# Patient Record
Sex: FEMALE | Race: WHITE | NOT HISPANIC OR LATINO | Employment: FULL TIME | URBAN - METROPOLITAN AREA
[De-identification: names, ages, dates, MRNs, and addresses within clinical notes are randomized per-mention and may not be internally consistent; named-entity substitution may affect disease eponyms.]

---

## 2017-01-24 ENCOUNTER — ALLSCRIPTS OFFICE VISIT (OUTPATIENT)
Dept: OTHER | Facility: OTHER | Age: 47
End: 2017-01-24

## 2017-02-01 ENCOUNTER — ALLSCRIPTS OFFICE VISIT (OUTPATIENT)
Dept: OTHER | Facility: OTHER | Age: 47
End: 2017-02-01

## 2017-02-06 ENCOUNTER — GENERIC CONVERSION - ENCOUNTER (OUTPATIENT)
Dept: OTHER | Facility: OTHER | Age: 47
End: 2017-02-06

## 2017-02-13 ENCOUNTER — GENERIC CONVERSION - ENCOUNTER (OUTPATIENT)
Dept: OTHER | Facility: OTHER | Age: 47
End: 2017-02-13

## 2017-08-04 ENCOUNTER — ALLSCRIPTS OFFICE VISIT (OUTPATIENT)
Dept: OTHER | Facility: OTHER | Age: 47
End: 2017-08-04

## 2017-08-04 ENCOUNTER — TRANSCRIBE ORDERS (OUTPATIENT)
Dept: ADMINISTRATIVE | Facility: HOSPITAL | Age: 47
End: 2017-08-04

## 2017-08-04 DIAGNOSIS — R40.0 SLEEPINESS: Primary | ICD-10-CM

## 2017-08-04 DIAGNOSIS — G47.00 PERSISTENT DISORDER OF INITIATING OR MAINTAINING SLEEP: ICD-10-CM

## 2017-08-04 DIAGNOSIS — F41.9 ANXIETY DISORDER: ICD-10-CM

## 2017-08-08 ENCOUNTER — TRANSCRIBE ORDERS (OUTPATIENT)
Dept: ADMINISTRATIVE | Facility: HOSPITAL | Age: 47
End: 2017-08-08

## 2017-08-08 DIAGNOSIS — G47.00 PERSISTENT DISORDER OF INITIATING OR MAINTAINING SLEEP: ICD-10-CM

## 2017-08-08 DIAGNOSIS — E66.9 OBESITY, UNSPECIFIED: ICD-10-CM

## 2017-08-08 DIAGNOSIS — R53.83 FATIGUE, UNSPECIFIED TYPE: ICD-10-CM

## 2017-08-08 DIAGNOSIS — R40.0 SLEEPINESS: Primary | ICD-10-CM

## 2017-08-10 ENCOUNTER — TRANSCRIBE ORDERS (OUTPATIENT)
Dept: SLEEP CENTER | Facility: CLINIC | Age: 47
End: 2017-08-10

## 2017-08-10 DIAGNOSIS — R40.0 DAYTIME SLEEPINESS: Primary | ICD-10-CM

## 2017-08-18 ENCOUNTER — TRANSCRIBE ORDERS (OUTPATIENT)
Dept: SLEEP CENTER | Facility: CLINIC | Age: 47
End: 2017-08-18

## 2018-01-09 NOTE — PROGRESS NOTES
Assessment    1  Encounter for preventive health examination (V70 0) (Z00 00)   2  Need for hepatitis B screening test (V73 89) (Z11 59)   3  Anxiety (300 00) (F41 9)   4  Iron deficiency anemia (280 9) (D50 9)   5  Obesity (278 00) (E66 9)   6  Tinea cruris (110 3) (B35 6)   7  Need for Tdap vaccination (V06 1) (Z23)    Plan  Anxiety, Iron deficiency anemia, Obesity    · (1) COMPREHENSIVE METABOLIC PANEL; Status:Active; Requested for:10Oct2016;    · (1) FERRITIN; Status:Active; Requested for:10Oct2016;    · (1) IRON; Status:Active; Requested for:10Oct2016;    · (1) TIBC; Status:Active; Requested for:10Oct2016;    · (1) TRANSFERRIN; Status:Active; Requested for:10Oct2016;    · (LC) CBC+Platelet+Hem Review; Status:Active; Requested for:10Oct2016;    · ([facility]) Lipid Panel; Status:Active; Requested for:10Oct2016;    · (LC) TSH reflex to T4F; Status:Active; Requested for:10Oct2016;   Need for hepatitis B screening test    · (LC) Hepatitis B Core Ab W/Reflex; Status:Active; Requested for:10Oct2016;    · ([facility]) Hepatitis B Surf Ab Quant; Status:Active; Requested for:10Oct2016;   Need for Tdap vaccination    · Tdap (Adacel)  PMH: Candidal intertrigo    · Clotrimazole-Betamethasone 1-0 05 % External Cream (Lotrisone); APPLY   AND RUB  IN A THIN FILM TO AFFECTED AREAS TWICE  DAILY  (AM AND PM)  DO NOT USE FOR OVER 2 WEEKS    Discussion/Summary  health maintenance visit Currently, she eats a poor diet, has an inadequate exercise regimen and Encouraged the patient to work on portion sizes as well as maintaining regular exercise  She reports that she will be going in the next few weeks for her next Pap smear  Breast cancer screening: She reports that she'll be getting this done through her gynecologist in the next few weeks  Colorectal cancer screening: colorectal cancer screening is not indicated  Osteoporosis screening: bone mineral density testing is not indicated  Screening lab work includes Labs ordered as above   The immunizations will be given as outlined in the orders and The patient declined a flu shot but took a tetanus vaccine today as above  Advice and education were given regarding nutrition, aerobic exercise and weight loss  Patient discussion: discussed with the patient  Anxiety - stable - continue with Ativan as needed for anxiety  Obesity - patient will check with her insurance to see if 6 end is covered  She states that she does not have any personal history of pancreatitis her family history of thyroid cancer  We discussed the medication and its use  If she is able to start her on it, I would want to follow-up one month after her starting it  New    Iron deficiency anemia (history of) - reassess with labs as above  We discussed that if she does have iron deficiency currently that she continue her to oral supplementation and I could provide her stool softeners to take or she could see a hematologist about the possibility of IV infusion iron  Tinea cruris - try Lotrisone as above  She was instructed not to use it for longer than 2 weeks at a time  Chief Complaint  Physical      History of Present Illness  HM, Adult Female: The patient is being seen for a health maintenance evaluation  General Health: The patient's health since the last visit is described as good   besides migraines and panic attacks  She has regular dental visits  She complains of vision problems  Vision care includes wearing glasses, an eye examination within the last year and got bifocals now  She denies hearing loss  Immunizations status: not up to date The patient needs the following immunization(s): tetanus vaccine  Lifestyle:  She consumes a diverse and healthy diet  (eats well-balanced meals - started protein shakes - drinks water )   She exercises regularly  She exercises 3 times per week  Exercise includes walking  She does not use tobacco  She denies alcohol use  She denies drug use     Reproductive health:  she reports abnormal menses  Menstrual history: The cycles are irregular  Screening: Cervical cancer screening includes a pap smear performed 2-3 years  Breast cancer screening includes a mammogram performed 2-3 years  Colorectal cancer screening includes no previous colonoscopy  Metabolic screening includes no previous lipid profile and glucose screening performed 2014  Safety elements used: seat belt, sunscreen, smoke detector and carbon monoxide detector  Risk findings: no domestic violence  HPI: rash recurring in the groin - thinks that some of the powder that she is in the past was helpful - not currently using anything right now- no longer swimming/lifeguarding so this cannot be the cause this time    wants to restart the Adipex - was having trouble with anxiety and migraines - notes that she walks and wants to avoid high impact because of knee issues      Review of Systems    Constitutional: no fever and no chills  Cardiovascular: no chest pain and no palpitations  Respiratory: besides SOB with panic, but no shortness of breath, no cough and no wheezing  Gastrointestinal: constipation and diarrhea, but no nausea and no vomiting  Genitourinary: no dysuria and no vaginal discharge  Musculoskeletal: no arthralgias and no myalgias  Integumentary: a rash  Neurological: no dizziness and no fainting    The patient presents with complaints of headache (migraines around cycle and occasionally on surprise (from stress?))  Psychiatric: anxiety, but as noted in HPI and no depression  Endocrine: no hot flashes  Hematologic/Lymphatic: no tendency for easy bleeding and no tendency for easy bruising  Active Problems    1  Adverse reaction to vaccine, initial encounter (E949 9) (T50 Z95A)   2  Anxiety (300 00) (F41 9)   3  Asthma (493 90) (J45 909)   4  History of Dysphagia (787 20) (R13 10)   5  Fatigue (780 79) (R53 83)   6  Iron deficiency anemia (280 9) (D50 9)   7   Irritable bowel syndrome (564  1) (K58 9)   8  Left knee pain (719 46) (M25 562)   9  Lung nodule < 6cm on CT (793 11) (R91 1)   10  Migraine headache (346 90) (G43 909)   11  Obesity (278 00) (E66 9)   12  Palpitations (785 1) (R00 2)   13  Positive PPD (795 51) (R76 11)   14  Tinea corporis (110 5) (B35 4)   15   Tinea cruris (110 3) (B35 6)    Past Medical History    · History of  (637 90)   · Acute bacterial conjunctivitis (372 03) (H10 30)   · History of BRBPR (bright red blood per rectum) (569 3) (K62 5)   · History of Candidal intertrigo (112 3) (B37 2)   · History of Cough (786 2) (R05)   · History of Coughing up blood (786 30) (R04 2)   · History of Dysphagia (787 20) (R13 10)   · History of acute bronchitis (V12 69) (Z87 09)   · History of bronchitis (V12 69) (Z87 09)   · History of chest pain (V13 89) (R27 911)   · History of contact dermatitis (V13 3) (Z87 2)   · History of folliculitis (M78 8) (I73 7)   · History of insomnia (V13 89) (Z87 898)   · History of low back pain (V13 59) (Z87 39)   · History of migraine (V12 49) (Z86 69)   · History of upper respiratory infection (V12 09) (Z87 09)   · History of viral gastroenteritis (V12 09) (Z86 19)   · History of viral infection (V12 09) (Z86 19)   · Mammogram abnormal (793 80) (R92 8)   · History of Mass of leg (782 2) (R22 40)   · History of Missed  (632) (O02 1)   · History of Scoliosis (737 30) (M41 9)   · History of Summary Of Previous Pregnancies  2  (Total No )   · History of Summary Of Previous Pregnancies Para 1  (Deliveries)   · History of Tendinitis of right rotator cuff (726 10) (M75 81)   · History of Tick bite (919 4,E906 4) (W57 XXXA)    Surgical History    · History of Bronchoscopy (Diagnostic)   · History of  Section   · History of Cholecystectomy   · History of Epidural Anesthesia   · History of Hernia Repair   · History of Laparoscopic Lysis Of Intestinal Adhesions   · History of Nasal Endoscopy (Diagnostic)   · History of Tonsillectomy With Adenoidectomy   · History of Upper Gastrointestinal Endoscopy (Therapeutic)    Family History  Mother    · No pertinent family history  Family History    · Family history of Basal cell cancer   · Family history of COPD (chronic obstructive pulmonary disease)   · Family history of Heart disease   · Family history of Hypertension (V17 49)    Social History    · Being A Social Drinker   · Denied: Daily Coffee Consumption (___ Cups/Day)   · Denied: History of Drug Use   · Marital History - Currently    · Never A Smoker    Current Meds   1  LORazepam 1 MG Oral Tablet; 1/2 to 1 tablet po up to tid prn anxiety; Therapy: 89OEN1742 to (Evaluate:63Rux4448); Last Rx:30Jun2016 Ordered   2  Naproxen TABS; Therapy: (Recorded:59Bsa3798) to Recorded   3  Ondansetron 4 MG Oral Tablet Dispersible; 1 tab po q 4-6 hrs prn nausea; Therapy: 81UYJ0244 to (Last Rx:30Jun2016)  Requested for: 36BIL9306 Ordered   4  ProAir  (90 Base) MCG/ACT Inhalation Aerosol Solution; INHALE 2 PUFFS 3   TIMES DAILY AS NEEDED; Therapy: 54AWC1672 to (Last Rx:55Lgx5129)  Requested for: 26Ihj8705 Ordered   5  Rizatriptan Benzoate 10 MG Oral Tablet Dispersible; TAKE 1 TABLET AT ONSET OF   HEADACHE  MAY REPEAT EVERY 2 HOURS AS NEEDED  MAXIMUM 3 TABLETS IN 24   HOURS; Therapy: 39XQA7207 to (Evaluate:26Yso9219)  Requested for: 76PLI2921; Last   Rx:30Jun2016 Ordered    Allergies    1  Penicillins    Vitals   Recorded: 70CUW8716 06:44PM Recorded: 47GMW6825 17:57HI   Systolic 478 563   Diastolic 88 92   Heart Rate  88   Temperature  98 9 F   Height  5 ft 3 5 in   Weight  216 lb    BMI Calculated  37 66   BSA Calculated  2     Physical Exam    Constitutional   General appearance: Abnormal   obese  Head and Face   Head and face: Normal     Eyes   Conjunctiva and lids: No swelling, erythema or discharge  Ears, Nose, Mouth, and Throat   Hearing: Normal     Neck   Neck: Supple, symmetric, trachea midline, no masses  Thyroid: Normal, no thyromegaly  Pulmonary   Respiratory effort: No increased work of breathing or signs of respiratory distress  Auscultation of lungs: Clear to auscultation  Cardiovascular   Auscultation of heart: Normal rate and rhythm, normal S1 and S2, no murmurs  Peripheral vascular exam: Normal   Radial: right 2+ and left 2+  Posterior tibialis: right 2+ and left 2+  Examination of extremities for edema and/or varicosities: Normal   no edema  Abdomen   Abdomen: Non-tender, no masses  Liver and spleen: No hepatomegaly or splenomegaly  Lymphatic   Palpation of lymph nodes in neck: No lymphadenopathy  Musculoskeletal   Gait and station: Normal     Muscle strength/tone: Normal   Motor Strength Findings: normal upper extremity strength and normal lower extremity strength  Skin   Skin and subcutaneous tissue: Abnormal   Erythematous papules and patch in the groin area bilaterally but worse on the left, no active drainage or bleeding, scabbing noted over the satellite lesions  Neurologic   Sensation: No sensory loss  Psychiatric   Mood and affect: Normal        Procedure    Procedure: Visual Acuity Test    Indication: routine screening  Results: 20/25 in both eyes with corrective device, 20/30 in the right eye with corrective device, 20/25 in the left eye with corrective device   The patient was cooperative, but tolerated the procedure well  There were no complications        Signatures   Electronically signed by : Daniel Crain [facility]; Oct 10 2016  6:51PM EST                       (Author)    Electronically signed by : ANN Mccormick ; Oct 10 2016  9:58PM EST

## 2018-01-13 VITALS
HEART RATE: 96 BPM | DIASTOLIC BLOOD PRESSURE: 82 MMHG | BODY MASS INDEX: 37.22 KG/M2 | SYSTOLIC BLOOD PRESSURE: 120 MMHG | TEMPERATURE: 98.7 F | RESPIRATION RATE: 16 BRPM | HEIGHT: 64 IN | WEIGHT: 218 LBS

## 2018-01-13 VITALS
BODY MASS INDEX: 38.21 KG/M2 | TEMPERATURE: 99.3 F | SYSTOLIC BLOOD PRESSURE: 132 MMHG | RESPIRATION RATE: 16 BRPM | DIASTOLIC BLOOD PRESSURE: 80 MMHG | HEART RATE: 84 BPM | WEIGHT: 219.13 LBS

## 2018-01-14 VITALS
HEIGHT: 64 IN | HEART RATE: 88 BPM | TEMPERATURE: 97.9 F | WEIGHT: 219 LBS | BODY MASS INDEX: 37.39 KG/M2 | SYSTOLIC BLOOD PRESSURE: 122 MMHG | DIASTOLIC BLOOD PRESSURE: 74 MMHG | RESPIRATION RATE: 18 BRPM

## 2018-01-15 NOTE — RESULT NOTES
Verified Results  ([birth year][address] [facility]) Hepatitis B Surf Ab Quant 10KMB2256 09:10AM Sangeetarebecca Gordon     Test Name Result Flag Reference   Hepatitis B Surf Ab Quant 4 7 mIU/mL L Immunity>9 9   Status of Immunity                     Anti-HBs Level                   ------------------                     --------------                 Inconsistent with Immunity                   0 0 - 9 9                 Consistent with Immunity                          >9 9     (LC) Hepatitis B Core Ab W/Reflex 12Oct2016 09:10AM Sangeeta Gordon     Test Name Result Flag Reference   Hep B Core Ab, Tot Negative  Negative     ([birth year][address] [facility]) CBC+Platelet+Hem Review 98KBR1305 09:10AM Sangeeta Gordon     Test Name Result Flag Reference   WBC 6 1 x10E3/uL  3 4-10 8   RBC 4 60 x10E6/uL  3 77-5 28   Hemoglobin 11 8 g/dL  11 1-15 9   Hematocrit 36 4 %  34 0-46  6   MCV 79 fL  79-97   MCH 25 7 pg L 26 6-33 0   Eos (Absolute Value) 0 2 X10E3/uL  0 0-0 4   Baso(Absolute) 0 0 X10E3/uL  0 0-0 2   RBC Comment RBC's appear normal   Normal   Platelet Comment Adequate  Adequate   Myelocytes 1 % H 0 - 0   Eos 3 %     Basos 0 %     Immature Cells Note     Neutrophils Absolute 3 5 X10E3/uL  1 4-7 0   Lymphs (Absolute) 2 1 X10E3/uL  0 7-3 1   Monocytes(Absolute) 0 3 X10E3/uL  0 1-0 9   MCHC 32 4 g/dL  31 5-35 7   RDW 15 0 %  12 3-15 4   Platelets 473 K78M1/QR  150-379   Neutrophils 57 %     Lymphs 34 %     Monocytes 5 %       (1) FERRITIN 12Oct2016 09:10AM Sangeeta Gordon     Test Name Result Flag Reference   Ferritin, Serum 16 ng/mL       (1) TRANSFERRIN 12Oct2016 09:10AM Sangeeta Gordon     Test Name Result Flag Reference   Transferrin 388 mg/dL H 200-370     (1) COMPREHENSIVE METABOLIC PANEL 24MBY1669 08:06AN Sangeetarebecca Gordon     Test Name Result Flag Reference   Glucose, Serum 90 mg/dL  65-99   BUN 11 mg/dL  6-24   Creatinine, Serum 0 66 mg/dL  0 57-1 00   eGFR If NonAfricn Am 107 mL/min/1 73  >59   eGFR If Africn Am 123 mL/min/1 73  >59   BUN/Creatinine Ratio 17  9-23   ALT (SGPT) 18 IU/L  0-32   Albumin, Serum 4 0 g/dL  3 5-5 5   Globulin, Total 2 8 g/dL  1 5-4 5   A/G Ratio 1 4  1 1-2 5   Bilirubin, Total 0 3 mg/dL  0 0-1 2   Alkaline Phosphatase, S 84 IU/L     AST (SGOT) 17 IU/L  0-40   Sodium, Serum 142 mmol/L  134-144   **Effective October 17, 2016 the reference interval**                   for Sodium, Serum will be changing to:                                                             136 - 144   Potassium, Serum 4 3 mmol/L  3 5-5 2   **Effective October 17, 2016 the reference interval**                   for Potassium, Serum will be changing to:                                          0 -  7 days        3 7 - 5 2                                          8 - 30 days        3 7 - 6 4                                          1 -  6 months      3 8 - 6 0                                   7 months -  1 year        3 8 - 5 3                                              >1 year        3 5 - 5 2   Chloride, Serum 103 mmol/L     **Effective October 17, 2016 the reference interval**                   for Chloride, Serum will be changing to:                                                              97 - 106   Carbon Dioxide, Total 21 mmol/L  18-29   Calcium, Serum 8 9 mg/dL  8 7-10 2   Protein, Total, Serum 6 8 g/dL  6 0-8 5     (LC) Lipid Panel 24ZEE7072 09:10AM Genora Seal     Test Name Result Flag Reference   Cholesterol, Total 137 mg/dL  100-199   Triglycerides 84 mg/dL  0-149   HDL Cholesterol 38 mg/dL L >39   According to ATP-III Guidelines, HDL-C >59 mg/dL is considered a  negative risk factor for CHD  VLDL Cholesterol Fidel 17 mg/dL  5-40   LDL Cholesterol Calc 82 mg/dL  0-99     (LC) TSH reflex to T4F 12Oct2016 09:10AM Genora Seal     Test Name Result Flag Reference   TSH 0 496 uIU/mL  0 450-4 500     (1) IRON SATURATION %, TIBC 12Oct2016 09:10AM Genora Seal     Test Name Result Flag Reference   Iron Bind  Cap (TIBC) 439 ug/dL  250-450   UIBC 385 ug/dL 131-425   Iron, Serum 54 ug/dL     Iron Saturation 12 % L 15-55     (LC) HBsAg Screen 12Oct2016 09:10AM Evette Chandler   A courtesy copy of this report has been sent to  the patient  Test Name Result Flag Reference   HBsAg Screen Negative  Negative     [facility]) Written Authorization [id number] 09:10AM Evette Chandler     Test Name Result Flag Reference   Written Authorization Comment     Written Authorization Received    Authorization received from [address] [place] [address] 10-  Logged by Elina Mckinley

## 2018-01-17 NOTE — PROGRESS NOTES
Assessment    1  Viral illness (079 99) (B34 9)    Plan  Viral illness    · ProAir  (90 Base) MCG/ACT Inhalation Aerosol Solution; INHALE 2 PUFFS  3 TIMES DAILY AS NEEDED   · Promethazine-Codeine 6 25-10 MG/5ML Oral Syrup; TAKE 5 ML BY MOUTH AT  BEDTIME AS NEEDED    Discussion/Summary    Patient is here with viral illness and multiple symptoms  She is most concerned because of coughing and wheezing and history of bronchitis  Recommended increase fluid intake and plain Mucinex  She may use naproxen or Tylenol for fever and discomfort  Also sent inhaler to her pharmacy to use 2 puffs up to 3 times per day and promethazine with codeine for night cough  Recommended recheck if her symptoms are not improving  Chief Complaint  Cold      History of Present Illness  HPI: Developed cold sx over past weekend  Fatigue, runny nose, right earache  Also notes wheeze and chest   tightness  Fevers on and off feels hot and cold  Diarrhea started yesterday  right earache  constant sneezing  Missed work today       Review of Systems    Constitutional: fever, feeling poorly, chills and feeling tired  ENT: earache, sore throat and nasal discharge  Respiratory: shortness of breath, cough and wheezing  Active Problems    1  Adverse reaction to vaccine, initial encounter (E949 9) (T50 Z95A)   2  Anxiety (300 00) (F41 9)   3  Asthma (493 90) (J45 909)   4  BRBPR (bright red blood per rectum) (569 3) (K62 5)   5  Dysphagia (787 20) (R13 10)   6  Fatigue (780 79) (R53 83)   7  Iron deficiency anemia (280 9) (D50 9)   8  Irritable bowel syndrome (564 1) (K58 9)   9  Left knee pain (719 46) (M25 562)   10  Lung nodule < 6cm on CT (793 11) (R91 1)   11  Migraine headache (346 90) (G43 909)   12  Obesity (278 00) (E66 9)   13  Palpitations (785 1) (R00 2)   14  Positive PPD (795 51) (R76 11)   15  Tendinitis of right rotator cuff (726 10) (M75 81)   16  Tick bite (919 4,E906 4) (T14 8,W57  XXXA)   17   Tinea corporis (110 5) (B35 4)   18  Tinea cruris (110 3) (B35 6)    Past Medical History    1  History of  (637 90)   2  Acute bacterial conjunctivitis (372 03) (H10 30)   3  History of Candidal intertrigo (112 3) (B37 2)   4  History of Coughing up blood (786 30) (R04 2)   5  History of bronchitis (V12 69) (Z87 09)   6  History of chest pain (V13 89) (Z87 898)   7  History of contact dermatitis (V13 3) (Z87 2)   8  History of folliculitis (V63 5) (U30 6)   9  History of insomnia (V13 89) (Z87 898)   10  History of low back pain (V13 59) (Z87 39)   11  History of upper respiratory infection (V12 09) (Z87 09)   12  History of viral gastroenteritis (V12 09) (Z86 19)   13  Mammogram abnormal (793 80) (R92 8)   14  History of Mass of leg (782 2) (R22 40)   15  History of Missed  (69 849 69 22) (O02 1)   16  History of Scoliosis (737 30) (M41 9)   17  History of Summary Of Previous Pregnancies  2  (Total No )   18  History of Summary Of Previous Pregnancies Para 1  (Deliveries)  Active Problems And Past Medical History Reviewed: The active problems and past medical history were reviewed and updated today  Family History    1  No pertinent family history    2  Family history of Basal cell cancer   3  Family history of COPD (chronic obstructive pulmonary disease)   4  Family history of Heart disease   5  Family history of Hypertension (V17 49)    Social History    · Being A Social Drinker   · Denied: Daily Coffee Consumption (___ Cups/Day)   · Denied: History of Drug Use   · Marital History - Currently    · Never A Smoker    Surgical History    1  History of Bronchoscopy (Diagnostic)   2  History of  Section   3  History of Cholecystectomy   4  History of Epidural Anesthesia   5  History of Hernia Repair   6  History of Laparoscopic Lysis Of Intestinal Adhesions   7  History of Nasal Endoscopy (Diagnostic)   8  History of Tonsillectomy With Adenoidectomy   9   History of Upper Gastrointestinal Endoscopy (Therapeutic)    Current Meds   1  Doxycycline Hyclate 100 MG Oral Capsule; Take 2 tablets once by mouth; Therapy: 26IIT8864 to (Evaluate:26Xfw7474)  Requested for: 0676 543 19 15; Last   Rx:63Nsd5413 Ordered   2  LORazepam 0 5 MG Oral Tablet; Take 1 tablet 3 times daily as needed; Therapy: 36DGT1382 to (Evaluate:06Mar2016); Last Rx:78Pfj7354 Ordered   3  Naproxen TABS; Therapy: (Recorded:08Iyw6345) to Recorded   4  Sertraline HCl - 100 MG Oral Tablet; TAKE 1 TABLET EVERY DAY; Therapy: 29GAO1299 to ([place])  Requested for: 00XSN4168; Last   Rx:27Yto8146 Ordered    Allergies    1  Penicillins    Vitals   Recorded: 01SXB1167 01:16PM   Temperature 99 7 F   Heart Rate 100   Respiration 18   Systolic 349   Diastolic 88   Height 5 ft 3 in   Weight 212 lb 8 0 oz   BMI Calculated 37 64   BSA Calculated 1 99     Physical Exam    Constitutional   General appearance: Abnormal   appears tired  Ears, Nose, Mouth, and Throat   External inspection of ears and nose: Normal     Otoscopic examination: Tympanic membranes translucent with normal light reflex  Canals patent without erythema  Nasal mucosa, septum, and turbinates: Abnormal   (congested) There was clear rhinorrhea from both nares  Oropharynx: Abnormal   (Mild redness with cobblestoning of the posterior wall)   Pulmonary   Respiratory effort: No increased work of breathing or signs of respiratory distress  Auscultation of lungs: Clear to auscultation  Cardiovascular   Auscultation of heart: Normal rate and rhythm, normal S1 and S2, without murmurs  Examination of extremities for edema and/or varicosities: Normal     Lymphatic   Palpation of lymph nodes in neck: No lymphadenopathy  Skin   Skin and subcutaneous tissue: Normal without rashes or lesions      Psychiatric   Orientation to person, place, and time: Normal     Mood and affect: Normal          Future Appointments    Date/Time Provider Specialty Site   02/08/2016 08:30 AM Sofiya Torres ANN Lind   Thoracic Surgery CT SURGERY [facility] OFFICE     Signatures   Electronically signed by : Silas Wright MD; Jan 19 2016  2:24PM EST                       (Author)

## 2018-01-18 NOTE — PROGRESS NOTES
Assessment    1  Acute bronchitis (466 0) (J20 9)   2  Asthma (493 90) (J45 909)    Plan  Acute bronchitis    · Azithromycin 250 MG Oral Tablet; TAKE 2 TABLETS ON DAY 1 THEN TAKE 1  TABLET A DAY FOR 4 DAYS   · Budesonide 1 MG/2ML Inhalation Suspension (Pulmicort); USE 1 UNIT DOSE VIA  NEBULIZER DAILY   · MethylPREDNISolone (Wilfred) 4 MG Oral Tablet (Medrol (Wilfred)); TAKE AS DIRECTED   · Promethazine-Codeine 6 25-10 MG/5ML Oral Syrup; TAKE 5 ML EVERY 4 TO 6  HOURS AS NEEDED FOR COUGH  Asthma    · Albuterol Sulfate (2 5 MG/3ML) 0 083% Inhalation Nebulization Solution; USE 1  UNIT DOSE EVERY 4-6 HOURS AS NEEDED FOR WHEEZING   Cough    · * XR CHEST PA & LATERAL; Status:Active - Retrospective By Protocol Authorization; Requested for:[id number];   Viral illness    · ProAir  (90 Base) MCG/ACT Inhalation Aerosol Solution; INHALE 2  PUFFS 3 TIMES DAILY AS NEEDED    Discussion/Summary  Discussion Summary:   I have reviewed meds and treatment plan with detailed instructions  The patient verbalized an understanding  The xray looked to be wnl  She needs to start her asthma medications  Medication Side Effects Reviewed: Possible side effects of new medications were reviewed with the patient/guardian today  Understands and agrees with treatment plan: The treatment plan was reviewed with the patient/guardian  The patient/guardian understands and agrees with the treatment plan   Counseling Documentation With Imm: The patient was counseled regarding instructions for management, patient and family education, importance of compliance with treatment  total time of encounter was 14 minutes  Chief Complaint    1  Cold Symptoms  Chief Complaint Free Text Note Form: pt  c/o cough and wheezing x 1 week      History of Present Illness  HPI: Patient is here today for cough and cold s/s  She has a history of asthma but did not use any of her meds yet  She has a cough that has been keeping her up at night     Hospital Based Practices Required Assessment:   Pain Assessment   the patient states they do not have pain  (on a scale of 0 to 10, the patient rates the pain at 0 )   Abuse And Domestic Violence Screen    Yes, the patient is safe at home  The patient states no one is hurting them  Depression And Suicide Screen  No, the patient has not had thoughts of hurting themself  No, the patient has not felt depressed in the past 7 days  Review of Systems  Focused-Female:   Constitutional: fever, feeling poorly, chills and feeling tired  ENT: nasal discharge  Cardiovascular: no complaints of slow or fast heart rate, no chest pain, no palpitations, no leg claudication or lower extremity edema  Respiratory: cough and wheezing  Breasts: no complaints of breast pain, breast lump or nipple discharge  Gastrointestinal: no complaints of abdominal pain, no constipation, no nausea or diarrhea, no vomiting, no bloody stools  Genitourinary: no complaints of dysuria, no incontinence, no pelvic pain, no dysmenorrhea, no vaginal discharge or abnormal vaginal bleeding  Musculoskeletal: no complaints of arthralgia, no myalgia, no joint swelling or stiffness, no limb pain or swelling  Integumentary: no complaints of skin rash or lesion, no itching or dry skin, no skin wounds  Neurological: no complaints of headache, no confusion, no numbness or tingling, no dizziness or fainting  Active Problems    1  Adverse reaction to vaccine, initial encounter (E949 9) (T50 Z95A)   2  Anxiety (300 00) (F41 9)   3  Asthma (493 90) (J45 909)   4  BRBPR (bright red blood per rectum) (569 3) (K62 5)   5  Dysphagia (787 20) (R13 10)   6  Fatigue (780 79) (R53 83)   7  Iron deficiency anemia (280 9) (D50 9)   8  Irritable bowel syndrome (564 1) (K58 9)   9  Left knee pain (719 46) (M25 562)   10  Lung nodule < 6cm on CT (793 11) (R91 1)   11  Migraine headache (346 90) (G43 909)   12  Obesity (278 00) (E66 9)   13  Palpitations (785 1) (R00 2)   14  Positive PPD (795 51) (R76 11)   15  Tendinitis of right rotator cuff (726 10) (M75 81)   16  Tick bite (919 4,E906 4) (T14 8,W57  XXXA)   17  Tinea corporis (110 5) (B35 4)   18  Tinea cruris (110 3) (B35 6)   19  Viral illness (079 99) (B34 9)    Past Medical History    1  History of  (637 90)   2  Acute bacterial conjunctivitis (372 03) (H10 30)   3  History of Candidal intertrigo (112 3) (B37 2)   4  History of Coughing up blood (786 30) (R04 2)   5  History of bronchitis (V12 69) (Z87 09)   6  History of chest pain (V13 89) (Z87 898)   7  History of contact dermatitis (V13 3) (Z87 2)   8  History of folliculitis (M28 5) (E79 7)   9  History of insomnia (V13 89) (Z87 898)   10  History of low back pain (V13 59) (Z87 39)   11  History of upper respiratory infection (V12 09) (Z87 09)   12  History of viral gastroenteritis (V12 09) (Z86 19)   13  Mammogram abnormal (793 80) (R92 8)   14  History of Mass of leg (782 2) (R22 40)   15  History of Missed  (69 849 69 22) (O02 1)   16  History of Scoliosis (737 30) (M41 9)   17  History of Summary Of Previous Pregnancies  2  (Total No )   18  History of Summary Of Previous Pregnancies Para 1  (Deliveries)    Family History    1  No pertinent family history    2  Family history of Basal cell cancer   3  Family history of COPD (chronic obstructive pulmonary disease)   4  Family history of Heart disease   5  Family history of Hypertension (V17 49)    Social History    · Being A Social Drinker   · Denied: Daily Coffee Consumption (___ Cups/Day)   · Denied: History of Drug Use   · Marital History - Currently    · Never A Smoker    Surgical History    1  History of Bronchoscopy (Diagnostic)   2  History of  Section   3  History of Cholecystectomy   4  History of Epidural Anesthesia   5  History of Hernia Repair   6  History of Laparoscopic Lysis Of Intestinal Adhesions   7  History of Nasal Endoscopy (Diagnostic)   8   History of Tonsillectomy With Adenoidectomy   9  History of Upper Gastrointestinal Endoscopy (Therapeutic)    Current Meds   1  LORazepam 0 5 MG Oral Tablet; Take 1 tablet 3 times daily as needed; Therapy: 47MHD2503 to (Evaluate:06Mar2016); Last Rx:[id number] Ordered   2  Naproxen TABS; Therapy: (Recorded:07Dec2015) to Recorded   3  ProAir  (90 Base) MCG/ACT Inhalation Aerosol Solution; INHALE 2 PUFFS 3   TIMES DAILY AS NEEDED; Therapy: 79IAU0927 to (Last Rx:19Jan2016)  Requested for: 51NZY9424 Ordered   4  Sertraline HCl - 100 MG Oral Tablet; TAKE 1 TABLET EVERY DAY; Therapy: 90NOT6627 to (Kenia Simon)  Requested for: 70QRE7691; Last   Rx:[id number] Ordered    Allergies    1  Penicillins    Vitals  Signs [Data Includes: Current Encounter]   Recorded: 54Zix5401 09:08AM   Temperature: 98 F  Heart Rate: 90  Respiration: 16  Systolic: 010  Diastolic: 86  Height: 5 ft 3 in  Weight: 212 lb   BMI Calculated: 37 55  BSA Calculated: 1 98  O2 Saturation: 98, RA    Physical Exam    Constitutional   General appearance: No acute distress, well appearing and well nourished  Eyes   Conjunctiva and lids: No swelling, erythema or discharge  Pupils and irises: Equal, round and reactive to light  Ears, Nose, Mouth, and Throat   External inspection of ears and nose: Abnormal   clear nasal drainage  Otoscopic examination: Tympanic membranes translucent with normal light reflex  Canals patent without erythema  Nasal mucosa, septum, and turbinates: Normal without edema or erythema  Oropharynx: Normal with no erythema, edema, exudate or lesions  Pulmonary   Respiratory effort: No increased work of breathing or signs of respiratory distress  Auscultation of lungs: Abnormal   harsh hacking cough, wheezes in all fields  Cardiovascular   Palpation of heart: Normal PMI, no thrills  Auscultation of heart: Normal rate and rhythm, normal S1 and S2, without murmurs      Examination of extremities for edema and/or varicosities: Normal     Abdomen   Abdomen: Non-tender, no masses  Liver and spleen: No hepatomegaly or splenomegaly  Lymphatic   Palpation of lymph nodes in neck: No lymphadenopathy  Musculoskeletal   Gait and station: Normal     Digits and nails: Normal without clubbing or cyanosis  Inspection/palpation of joints, bones, and muscles: Normal     Skin   Skin and subcutaneous tissue: Normal without rashes or lesions  Neurologic   Cranial nerves: Cranial nerves 2-12 intact  Reflexes: 2+ and symmetric  Sensation: No sensory loss      Psychiatric   Orientation to person, place, and time: Normal     Mood and affect: Normal        Signatures   Electronically signed by : Susan Bishop; Feb 21 2016  9:27AM EST                       (Author)

## 2021-04-23 ENCOUNTER — TELEPHONE (OUTPATIENT)
Dept: FAMILY MEDICINE CLINIC | Facility: CLINIC | Age: 51
End: 2021-04-23

## 2021-04-23 NOTE — TELEPHONE ENCOUNTER
Patient called stating she used to be a patient of our practiece prior to moving away  She said she is seeking information about the long-hauler program and is having some serious issues post covid  I tried to return her call but her mail box was full and I could not leave a message   Will attempt another call shortly

## 2021-05-10 ENCOUNTER — TELEPHONE (OUTPATIENT)
Dept: FAMILY MEDICINE CLINIC | Facility: CLINIC | Age: 51
End: 2021-05-10

## 2024-09-03 ENCOUNTER — APPOINTMENT (OUTPATIENT)
Dept: LAB | Facility: CLINIC | Age: 54
End: 2024-09-03

## 2024-10-01 PROBLEM — J84.9 ILD (INTERSTITIAL LUNG DISEASE) (HCC): Status: ACTIVE | Noted: 2021-12-13

## 2024-11-19 ENCOUNTER — OFFICE VISIT (OUTPATIENT)
Dept: FAMILY MEDICINE CLINIC | Facility: CLINIC | Age: 54
End: 2024-11-19
Payer: COMMERCIAL

## 2024-11-19 VITALS
SYSTOLIC BLOOD PRESSURE: 130 MMHG | OXYGEN SATURATION: 100 % | WEIGHT: 235.8 LBS | HEART RATE: 88 BPM | BODY MASS INDEX: 40.26 KG/M2 | DIASTOLIC BLOOD PRESSURE: 86 MMHG | HEIGHT: 64 IN | TEMPERATURE: 98.3 F

## 2024-11-19 DIAGNOSIS — J96.21 ACUTE ON CHRONIC RESPIRATORY FAILURE WITH HYPOXIA (HCC): ICD-10-CM

## 2024-11-19 DIAGNOSIS — R41.89 COGNITIVE DEFICITS: ICD-10-CM

## 2024-11-19 DIAGNOSIS — Z12.4 SCREENING FOR CERVICAL CANCER: ICD-10-CM

## 2024-11-19 DIAGNOSIS — J84.9 ILD (INTERSTITIAL LUNG DISEASE) (HCC): ICD-10-CM

## 2024-11-19 DIAGNOSIS — F33.2 MAJOR DEPRESSIVE DISORDER, RECURRENT SEVERE WITHOUT PSYCHOTIC FEATURES (HCC): ICD-10-CM

## 2024-11-19 DIAGNOSIS — E66.813 CLASS 3 SEVERE OBESITY WITH SERIOUS COMORBIDITY AND BODY MASS INDEX (BMI) OF 40.0 TO 44.9 IN ADULT, UNSPECIFIED OBESITY TYPE (HCC): ICD-10-CM

## 2024-11-19 DIAGNOSIS — Z00.00 WELL ADULT EXAM: ICD-10-CM

## 2024-11-19 DIAGNOSIS — U09.9 LONG COVID: ICD-10-CM

## 2024-11-19 DIAGNOSIS — E66.01 CLASS 3 SEVERE OBESITY WITH SERIOUS COMORBIDITY AND BODY MASS INDEX (BMI) OF 40.0 TO 44.9 IN ADULT, UNSPECIFIED OBESITY TYPE (HCC): ICD-10-CM

## 2024-11-19 DIAGNOSIS — Z12.11 SCREENING FOR COLON CANCER: ICD-10-CM

## 2024-11-19 DIAGNOSIS — J45.909 UNCOMPLICATED ASTHMA, UNSPECIFIED ASTHMA SEVERITY, UNSPECIFIED WHETHER PERSISTENT: Primary | ICD-10-CM

## 2024-11-19 PROCEDURE — 99386 PREV VISIT NEW AGE 40-64: CPT | Performed by: FAMILY MEDICINE

## 2024-11-19 RX ORDER — NYSTATIN 100000 [USP'U]/G
POWDER TOPICAL 4 TIMES DAILY
COMMUNITY

## 2024-11-19 RX ORDER — LORAZEPAM 0.5 MG/1
0.5 TABLET ORAL EVERY 8 HOURS PRN
COMMUNITY

## 2024-11-19 RX ORDER — ALBUTEROL SULFATE 0.83 MG/ML
2.5 SOLUTION RESPIRATORY (INHALATION) EVERY 4 HOURS PRN
COMMUNITY
Start: 2024-11-13

## 2024-11-19 RX ORDER — NYSTATIN 100000 U/G
CREAM TOPICAL 2 TIMES DAILY
COMMUNITY

## 2024-11-19 RX ORDER — NAPROXEN SODIUM 220 MG/1
220 TABLET, FILM COATED ORAL 2 TIMES DAILY WITH MEALS
COMMUNITY

## 2024-11-19 RX ORDER — ALBUTEROL SULFATE 90 UG/1
2 INHALANT RESPIRATORY (INHALATION) EVERY 6 HOURS PRN
COMMUNITY
Start: 2024-11-13

## 2024-11-19 RX ORDER — TIRZEPATIDE 2.5 MG/.5ML
2.5 INJECTION, SOLUTION SUBCUTANEOUS WEEKLY
Qty: 2 ML | Refills: 0 | Status: SHIPPED | OUTPATIENT
Start: 2024-11-19 | End: 2024-12-17

## 2024-11-19 RX ORDER — FLUTICASONE FUROATE, UMECLIDINIUM BROMIDE AND VILANTEROL TRIFENATATE 100; 62.5; 25 UG/1; UG/1; UG/1
1 POWDER RESPIRATORY (INHALATION) DAILY
COMMUNITY
Start: 2024-11-13

## 2024-11-19 NOTE — PROGRESS NOTES
Assessment/Plan: Anticipatory guidance provided.  Continue to follow-up with gynecologist annually.  Recommend pulmonology and neurology evaluations as well.  Patient will call with any new persisting or worsening symptoms.     1. Uncomplicated asthma, unspecified asthma severity, unspecified whether persistent  -     Ambulatory Referral to Pulmonology; Future  2. Well adult exam  3. ILD (interstitial lung disease) (HCC)  4. Screening for cervical cancer  -     Ambulatory Referral to Obstetrics / Gynecology; Future  5. Screening for colon cancer  -     Cologuard  6. Major depressive disorder, recurrent severe without psychotic features (HCC)  7. Acute on chronic respiratory failure with hypoxia (HCC)  8. Long COVID  -     Ambulatory Referral to Pulmonology; Future  -     Ambulatory referral to Neurology; Future  -     Ambulatory Referral to Nutrition Services; Future  9. Class 3 severe obesity with serious comorbidity and body mass index (BMI) of 40.0 to 44.9 in adult, unspecified obesity type (HCC)  Assessment & Plan:      Patient has been having some breathing issues after COVID infection 4 years ago.  She has had a difficult time losing weight.  I do recommend starting Zepbound as a trial for weight loss.  Weight loss may help with her breathing issues.  She continues to follow with pulmonologist.  Recommend follow-up with nutritionist as well  Orders:  -     tirzepatide (Zepbound) 2.5 mg/0.5 mL auto-injector; Inject 0.5 mL (2.5 mg total) under the skin once a week for 28 days  10. Cognitive deficits  -     Ambulatory referral to Neurology; Future  -     Ambulatory Referral to Nutrition Services; Future        Subjective:      Patient ID: Rubina Ambriz is a 54 y.o. female.    Patient is here today for well check.  She is transferring from [facility].  She has history of interstitial lung disease and follows with pulmonology.  Her symptoms of lung issues began about 4 years ago when she had COVID  "for the first time.  She was hospitalized for it.  She has had cognitive issues and brain fog since then and has seen both pulmonology and neurology for this.  She states she is currently disabled from work and has not been able to work secondary to difficult time focusing and secondary to her breathing issues.  Apparently a lung scan and pulmonary function testing is pending.  Denies any chest pain.  Pulmonologist also is concerned about her weight.  She has tried losing weight through diet and exercise and has been unsuccessful.             The following portions of the patient's history were reviewed and updated as appropriate: allergies, current medications, past family history, past medical history, past social history, past surgical history, and problem list.    Review of Systems   Constitutional: Negative.    HENT: Negative.     Eyes: Negative.    Respiratory:  Positive for shortness of breath.    Cardiovascular: Negative.    Gastrointestinal: Negative.    Endocrine: Negative.    Genitourinary: Negative.    Musculoskeletal: Negative.    Skin: Negative.    Allergic/Immunologic: Negative.    Neurological: Negative.    Hematological: Negative.    Psychiatric/Behavioral: Negative.           Objective:      /86   Pulse 88   Temp 98.3 °F (36.8 °C) (Tympanic)   Ht 5' 4\" (1.626 m)   Wt 107 kg (235 lb 12.8 oz)   SpO2 100%   BMI 40.47 kg/m²          Physical Exam  Vitals reviewed.   Constitutional:       Appearance: She is well-developed.   HENT:      Head: Normocephalic and atraumatic.      Right Ear: External ear normal. Tympanic membrane is not erythematous or bulging.      Left Ear: External ear normal. Tympanic membrane is not erythematous or bulging.      Nose: Nose normal.      Mouth/Throat:      Mouth: No oral lesions.      Pharynx: No oropharyngeal exudate.   Eyes:      General: No scleral icterus.        Right eye: No discharge.         Left eye: No discharge.      Conjunctiva/sclera: Conjunctivae " normal.   Neck:      Thyroid: No thyromegaly.   Cardiovascular:      Rate and Rhythm: Normal rate and regular rhythm.      Heart sounds: Normal heart sounds. No murmur heard.     No friction rub. No gallop.   Pulmonary:      Effort: Pulmonary effort is normal. No respiratory distress.      Breath sounds: No wheezing or rales.   Chest:      Chest wall: No tenderness.   Abdominal:      General: Bowel sounds are normal. There is no distension.      Palpations: Abdomen is soft. There is no mass.      Tenderness: There is no abdominal tenderness. There is no guarding or rebound.   Musculoskeletal:         General: No tenderness or deformity. Normal range of motion.      Cervical back: Normal range of motion and neck supple.   Lymphadenopathy:      Cervical: No cervical adenopathy.   Skin:     General: Skin is warm and dry.      Coloration: Skin is not pale.      Findings: No erythema or rash.   Neurological:      Mental Status: She is alert and oriented to person, place, and time.      Cranial Nerves: No cranial nerve deficit.      Motor: No abnormal muscle tone.      Coordination: Coordination normal.      Deep Tendon Reflexes: Reflexes are normal and symmetric.   Psychiatric:         Behavior: Behavior normal.

## 2024-11-19 NOTE — ASSESSMENT & PLAN NOTE
Patient has been having some breathing issues after COVID infection 4 years ago.  She has had a difficult time losing weight.  I do recommend starting Zepbound as a trial for weight loss.  Weight loss may help with her breathing issues.  She continues to follow with pulmonologist.  Recommend follow-up with nutritionist as well

## 2024-11-20 ENCOUNTER — TELEPHONE (OUTPATIENT)
Dept: FAMILY MEDICINE CLINIC | Facility: CLINIC | Age: 54
End: 2024-11-20

## 2024-11-20 NOTE — TELEPHONE ENCOUNTER
Reason for call:   [x] Prior Auth  [] Other:     Medication:   tirzepatide (Zepbound) 2.5 mg/0.5 mL        Dose/Frequency:  Inject 0.5 mL (2.5 mg total) under the skin once a week for 28 days     Quantity: 2 mL     Ordering Provider:   [x] PCP/Provider - Ben Rios DO   [] Speciality/Provider -

## 2024-11-21 NOTE — TELEPHONE ENCOUNTER
PA for Zepbound 2.5mg/0.5mL SUBMITTED to [employer]    via    [x]CMM-KEY: [id number]  []Surescripts-Case ID #   []Availity-Auth ID # NDC #   []Faxed to plan   []Other website   []Phone call Case ID #     [x]PA sent as URGENT    All office notes, labs and other pertaining documents and studies sent. Clinical questions answered. Awaiting determination from insurance company.     Turnaround time for your insurance to make a decision on your Prior Authorization can take 7-21 business days.

## 2024-11-21 NOTE — TELEPHONE ENCOUNTER
PA for Zepbound 2.5mg/0.5mL DENIED    Reason:    Message sent to office clinical pool Yes    Denial letter scanned into Media Yes    Appeal started No (Provider will need to decide if appeal is warranted and send clinical documentation to Prior Authorization Team for initiation.)    **Please follow up with your patient regarding denial and next steps**

## 2024-11-21 NOTE — TELEPHONE ENCOUNTER
Patient aware and will contact insurance company to see if any weight loss medications are covered

## 2024-11-21 NOTE — TELEPHONE ENCOUNTER
Pt called regarding the denial for her Zepbound.     Pt reported that she had significant weight gain due to medication that she was taking for covid since 2021. Pt also reported that she is unable to exercise due to damage done to lungs by infection.    Pulmonologist recommended pt have weight loss surgery. Pt did not want to go through such evasive surgery and would rather do the injections.     Insurance requesting a formulary exception request from pt's PCP for her to do the Zepbound Injections.     Please mail this exception if appropriate to:    [facility] [employer]  [address] [place] [address], [place] [address]

## 2024-11-22 NOTE — TELEPHONE ENCOUNTER
With Zepbound not being covered she may wish to check with the insurance to see if a different medication is covered.

## 2024-11-26 ENCOUNTER — RA CDI HCC (OUTPATIENT)
Dept: OTHER | Facility: HOSPITAL | Age: 54
End: 2024-11-26

## 2024-12-03 ENCOUNTER — OFFICE VISIT (OUTPATIENT)
Dept: FAMILY MEDICINE CLINIC | Facility: CLINIC | Age: 54
End: 2024-12-03
Payer: COMMERCIAL

## 2024-12-03 VITALS
HEART RATE: 94 BPM | TEMPERATURE: 97.6 F | BODY MASS INDEX: 41.82 KG/M2 | DIASTOLIC BLOOD PRESSURE: 84 MMHG | OXYGEN SATURATION: 97 % | HEIGHT: 63 IN | WEIGHT: 236 LBS | SYSTOLIC BLOOD PRESSURE: 126 MMHG

## 2024-12-03 DIAGNOSIS — E66.01 CLASS 3 SEVERE OBESITY WITH SERIOUS COMORBIDITY AND BODY MASS INDEX (BMI) OF 40.0 TO 44.9 IN ADULT, UNSPECIFIED OBESITY TYPE (HCC): Primary | ICD-10-CM

## 2024-12-03 DIAGNOSIS — Z11.4 SCREENING FOR HIV (HUMAN IMMUNODEFICIENCY VIRUS): ICD-10-CM

## 2024-12-03 DIAGNOSIS — E66.813 CLASS 3 SEVERE OBESITY WITH SERIOUS COMORBIDITY AND BODY MASS INDEX (BMI) OF 40.0 TO 44.9 IN ADULT, UNSPECIFIED OBESITY TYPE (HCC): Primary | ICD-10-CM

## 2024-12-03 PROCEDURE — 99214 OFFICE O/P EST MOD 30 MIN: CPT | Performed by: FAMILY MEDICINE

## 2024-12-03 RX ORDER — PHENTERMINE HYDROCHLORIDE 37.5 MG/1
37.5 TABLET ORAL DAILY
Qty: 30 TABLET | Refills: 0 | Status: SHIPPED | OUTPATIENT
Start: 2024-12-03 | End: 2025-01-02

## 2024-12-03 NOTE — PROGRESS NOTES
Assessment/Plan:  Patient's insurance unfortunately does not cover the GLP-1 medications apparently.  Side effect profile of medication reviewed.  She will call with any side effects or ineffectiveness of medication.    Patient also asked about her recent CT scan.  It does show some evidence of what is likely leftover scarring from COVID.  No other significant findings.  Hepatic cyst is stable.  She did ask about the possibility of being disabled from work although I do not see that she has any long-term chronic conditions currently that would disable her from being gainfully employed.  Recommend follow-up with pulmonologist on her CT results.   1. Class 3 severe obesity with serious comorbidity and body mass index (BMI) of 40.0 to 44.9 in adult, unspecified obesity type (HCC)  -     Ambulatory Referral to Nutrition Services; Future  -     phentermine (ADIPEX-P) 37.5 MG tablet; Take 1 tablet (37.5 mg total) by mouth in the morning  2. Screening for HIV (human immunodeficiency virus)        Subjective:      Patient ID: Rubina Ambriz is a 54 y.o. female.    Patient is seen today for concern about being overweight.  She has gradually gained weight over the last several years and has difficulty losing the weight with diet and exercise.  Unfortunately GLP-1 medications are apparently not covered by her insurance.  She would like to try a different medication.  She believes she may have been on phentermine in the distant past and believes she tolerated this well.    She also is following with a pulmonologist for history of long COVID symptoms.  She gets exertional dyspnea.  She did have a CT scan recently that showed some linear scarring but no other significant findings.             The following portions of the patient's history were reviewed and updated as appropriate: allergies, current medications, past family history, past medical history, past social history, past surgical history, and problem list.    Review of  "Systems   Constitutional: Negative.    HENT: Negative.     Eyes: Negative.    Respiratory: Negative.     Cardiovascular: Negative.    Gastrointestinal: Negative.    Endocrine: Negative.    Genitourinary: Negative.    Musculoskeletal: Negative.    Skin: Negative.    Allergic/Immunologic: Negative.    Neurological: Negative.    Hematological: Negative.    Psychiatric/Behavioral: Negative.           Objective:      /84   Pulse 94   Temp 97.6 °F (36.4 °C)   Ht 5' 3\" (1.6 m)   Wt 107 kg (236 lb)   SpO2 97%   BMI 41.81 kg/m²          Physical Exam  Vitals reviewed.   Constitutional:       Appearance: She is well-developed.   HENT:      Head: Normocephalic and atraumatic.      Right Ear: External ear normal. Tympanic membrane is not erythematous or bulging.      Left Ear: External ear normal. Tympanic membrane is not erythematous or bulging.      Nose: Nose normal.      Mouth/Throat:      Mouth: No oral lesions.      Pharynx: No oropharyngeal exudate.   Eyes:      General: No scleral icterus.        Right eye: No discharge.         Left eye: No discharge.      Conjunctiva/sclera: Conjunctivae normal.   Neck:      Thyroid: No thyromegaly.   Cardiovascular:      Rate and Rhythm: Normal rate and regular rhythm.      Heart sounds: Normal heart sounds. No murmur heard.     No friction rub. No gallop.   Pulmonary:      Effort: Pulmonary effort is normal. No respiratory distress.      Breath sounds: No wheezing or rales.   Chest:      Chest wall: No tenderness.   Abdominal:      General: Bowel sounds are normal. There is no distension.      Palpations: Abdomen is soft. There is no mass.      Tenderness: There is no abdominal tenderness. There is no guarding or rebound.   Musculoskeletal:         General: No tenderness or deformity. Normal range of motion.      Cervical back: Normal range of motion and neck supple.   Lymphadenopathy:      Cervical: No cervical adenopathy.   Skin:     General: Skin is warm and dry.      " Coloration: Skin is not pale.      Findings: No erythema or rash.   Neurological:      Mental Status: She is alert and oriented to person, place, and time.      Cranial Nerves: No cranial nerve deficit.      Motor: No abnormal muscle tone.      Coordination: Coordination normal.      Deep Tendon Reflexes: Reflexes are normal and symmetric.   Psychiatric:         Behavior: Behavior normal.

## 2025-03-24 ENCOUNTER — OFFICE VISIT (OUTPATIENT)
Dept: FAMILY MEDICINE CLINIC | Facility: CLINIC | Age: 55
End: 2025-03-24
Payer: COMMERCIAL

## 2025-03-24 VITALS
HEIGHT: 63 IN | HEART RATE: 90 BPM | SYSTOLIC BLOOD PRESSURE: 126 MMHG | BODY MASS INDEX: 41.67 KG/M2 | DIASTOLIC BLOOD PRESSURE: 80 MMHG | WEIGHT: 235.2 LBS | OXYGEN SATURATION: 98 % | TEMPERATURE: 98.2 F

## 2025-03-24 DIAGNOSIS — R07.89 ATYPICAL CHEST PAIN: Primary | ICD-10-CM

## 2025-03-24 DIAGNOSIS — F41.9 ANXIETY: ICD-10-CM

## 2025-03-24 DIAGNOSIS — F33.2 MAJOR DEPRESSIVE DISORDER, RECURRENT SEVERE WITHOUT PSYCHOTIC FEATURES (HCC): ICD-10-CM

## 2025-03-24 PROBLEM — J96.21 ACUTE ON CHRONIC RESPIRATORY FAILURE WITH HYPOXIA (HCC): Status: RESOLVED | Noted: 2024-11-19 | Resolved: 2025-03-24

## 2025-03-24 PROBLEM — J84.9 ILD (INTERSTITIAL LUNG DISEASE) (HCC): Status: RESOLVED | Noted: 2021-12-13 | Resolved: 2025-03-24

## 2025-03-24 PROCEDURE — 93000 ELECTROCARDIOGRAM COMPLETE: CPT | Performed by: FAMILY MEDICINE

## 2025-03-24 PROCEDURE — 99214 OFFICE O/P EST MOD 30 MIN: CPT | Performed by: FAMILY MEDICINE

## 2025-03-24 RX ORDER — LORAZEPAM 0.5 MG/1
0.5 TABLET ORAL EVERY 8 HOURS PRN
Qty: 90 TABLET | Refills: 1 | Status: SHIPPED | OUTPATIENT
Start: 2025-03-24

## 2025-03-24 RX ORDER — FLUOXETINE 20 MG/1
20 TABLET, FILM COATED ORAL DAILY
Qty: 90 TABLET | Refills: 3 | Status: SHIPPED | OUTPATIENT
Start: 2025-03-24 | End: 2025-03-25 | Stop reason: CLARIF

## 2025-03-24 RX ORDER — FLUOXETINE HYDROCHLORIDE 40 MG/1
CAPSULE ORAL
Refills: 0 | OUTPATIENT
Start: 2025-03-24

## 2025-03-24 NOTE — ASSESSMENT & PLAN NOTE
Orders:    LORazepam (ATIVAN) 0.5 mg tablet; Take 1 tablet (0.5 mg total) by mouth every 8 (eight) hours as needed for anxiety    FLUoxetine 20 MG tablet; Take 1 tablet (20 mg total) by mouth daily

## 2025-03-24 NOTE — ASSESSMENT & PLAN NOTE
Side effect profile of medication reviewed.  Recommend return to office if no improvement or worsening symptoms.  Consider follow-up with therapist regarding anxiety.  Orders:    LORazepam (ATIVAN) 0.5 mg tablet; Take 1 tablet (0.5 mg total) by mouth every 8 (eight) hours as needed for anxiety    FLUoxetine 20 MG tablet; Take 1 tablet (20 mg total) by mouth daily

## 2025-03-24 NOTE — PROGRESS NOTES
Name: Rubina Ambriz      : 1970      MRN: [id number]  Encounter Provider: Ben Rios DO  Encounter Date: 3/24/2025   Encounter department: [facility] system PRACT[facility]  :  Assessment & Plan  Atypical chest pain  EKG today looks normal.  Consider stress testing.  ER evaluation if any recurrent symptoms.  Patient will call or return to office if any persisting symptoms.  Otherwise we will see her again in 3 months for recheck.  Orders:    POCT ECG    Stress test only, exercise; Future    CBC and differential    Comprehensive metabolic panel    Lipid Panel with Direct LDL reflex; Future    Major depressive disorder, recurrent severe without psychotic features (HCC)    Side effect profile of medication reviewed.  Recommend return to office if no improvement or worsening symptoms.  Consider follow-up with therapist regarding anxiety.  Orders:    LORazepam (ATIVAN) 0.5 mg tablet; Take 1 tablet (0.5 mg total) by mouth every 8 (eight) hours as needed for anxiety    FLUoxetine 20 MG tablet; Take 1 tablet (20 mg total) by mouth daily    Anxiety    Orders:    LORazepam (ATIVAN) 0.5 mg tablet; Take 1 tablet (0.5 mg total) by mouth every 8 (eight) hours as needed for anxiety    FLUoxetine 20 MG tablet; Take 1 tablet (20 mg total) by mouth daily           History of Present Illness   For atypical chest pain the last 1 to 2 weeks.  Pain occurs at rest and with increased stress.  She does have history of anxiety and depression and is dealing with social stressors including some disciplinary issues with her son.  She expects these to last for the next several months.  This occasionally affects her sleep.  No exertional chest pain or shortness of breath.  She is not a current smoker and has no significant risk factors currently.    Anxiety  Symptoms include chest pain.         Review of Systems   Constitutional: Negative.    HENT: Negative.     Eyes: Negative.    Respiratory: Negative.     Cardiovascular:  Positive  "for chest pain.   Gastrointestinal: Negative.    Endocrine: Negative.    Genitourinary: Negative.    Musculoskeletal: Negative.    Skin: Negative.    Allergic/Immunologic: Negative.    Neurological: Negative.    Hematological: Negative.    Psychiatric/Behavioral: Negative.         Objective   /80   Pulse 90   Temp 98.2 °F (36.8 °C) (Tympanic)   Ht 5' 3\" (1.6 m)   Wt 107 kg (235 lb 3.2 oz)   SpO2 98%   BMI 41.66 kg/m²      Physical Exam  Constitutional:       General: She is not in acute distress.     Appearance: She is well-developed. She is not diaphoretic.   HENT:      Head: Normocephalic and atraumatic.      Right Ear: External ear normal.      Left Ear: External ear normal.      Nose: Nose normal.      Mouth/Throat:      Pharynx: Oropharynx is clear.   Eyes:      General: No scleral icterus.        Right eye: No discharge.         Left eye: No discharge.      Conjunctiva/sclera: Conjunctivae normal.      Pupils: Pupils are equal, round, and reactive to light.   Neck:      Thyroid: No thyromegaly.      Vascular: No JVD.      Trachea: No tracheal deviation.   Cardiovascular:      Rate and Rhythm: Normal rate and regular rhythm.      Heart sounds: Normal heart sounds. No murmur heard.     No friction rub. No gallop.   Pulmonary:      Effort: Pulmonary effort is normal. No respiratory distress.      Breath sounds: Normal breath sounds. No wheezing or rales.   Chest:      Chest wall: No tenderness.   Abdominal:      General: Bowel sounds are normal. There is no distension.      Palpations: Abdomen is soft. There is no mass.      Tenderness: There is no abdominal tenderness. There is no guarding or rebound.      Hernia: No hernia is present.   Musculoskeletal:         General: No tenderness or deformity. Normal range of motion.      Cervical back: Normal range of motion and neck supple.   Lymphadenopathy:      Cervical: No cervical adenopathy.   Skin:     General: Skin is warm and dry.      Capillary " Refill: Capillary refill takes less than 2 seconds.      Coloration: Skin is not pale.      Findings: No erythema or rash.   Neurological:      Mental Status: She is alert and oriented to person, place, and time.      Cranial Nerves: No cranial nerve deficit.      Sensory: No sensory deficit.      Motor: No abnormal muscle tone.      Coordination: Coordination normal.      Deep Tendon Reflexes: Reflexes normal.   Psychiatric:         Mood and Affect: Mood normal.         Behavior: Behavior normal.

## 2025-03-24 NOTE — TELEPHONE ENCOUNTER
Pt was asking if pcp could resent her prozac to her pharmacy. Pt states she went to the pharmacy and they don't see anything being sent. Please advise, thank you!

## 2025-03-25 ENCOUNTER — TELEPHONE (OUTPATIENT)
Dept: FAMILY MEDICINE CLINIC | Facility: CLINIC | Age: 55
End: 2025-03-25

## 2025-03-25 DIAGNOSIS — F33.2 MAJOR DEPRESSIVE DISORDER, RECURRENT SEVERE WITHOUT PSYCHOTIC FEATURES (HCC): Primary | ICD-10-CM

## 2025-03-25 RX ORDER — ESCITALOPRAM OXALATE 10 MG/1
10 TABLET ORAL DAILY
Qty: 90 TABLET | Refills: 1 | Status: SHIPPED | OUTPATIENT
Start: 2025-03-25 | End: 2025-09-21

## 2025-03-25 NOTE — TELEPHONE ENCOUNTER
FLUoxetine (PROzac) 40 MG capsule         Changed from: FLUoxetine 20 MG tablet    All pharmacy suggested alternatives are listed below    The original prescription was discontinued on 3/25/2025 by Ben Rios DO for the following reason: Formulary change. Renewing this prescription may not be appropriate.    Sig: N/A    Disp: Not specified    Refills: 0    Start: 3/24/2025    Class: Normal    For: Major depressive disorder, recurrent severe without psychotic features (HCC); Anxiety    Last ordered: Yesterday (3/24/2025) by Ben Rios DO    Last refill: 3/24/2025    Rx #: [id number]    Pharmacy comment: Alternative Requested:NOT ON FORMULARY.   All Pharmacy Suggested Alternatives:   FLUoxetine (PROzac) 40 MG capsule  citalopram (CeleXA) 10 mg tablet   To prescribe one of the alternatives listed above, open the encounter and click Replace.  Open Encounter    Psychiatry:  Antidepressants - SSRI [id number]/2025 06:40 PM   Protocol Details Valid encounter within last 6 months   [employer] Embedded [id number]/2025 06:40 PM   The requested medication is not on the active medication list.      This request has changes from the previous prescription.   To be filled at: [facility]/pharmacy #[id number] - [address]

## 2025-03-25 NOTE — TELEPHONE ENCOUNTER
Pt called stating that she was seen yesterday by Dr. Rios and prescribed 2 medications.    FLUoxetine 20 MG tablet and   LORazepam (ATIVAN) 0.5 mg tablet     Pt stated that she picked up the Lorazepam, but the pharmacy was saying that they did not have a script for the Fluoxetine.    Epic is showing and e-prescribing status on both: Receipt confirmed by pharmacy (3/24/2025 12:31 PM EDT)     Pt also reporting that she keeps receiving texts from the Pharmacy that she cannot refill the Lorazepam because it is too soon - but pt confirmed that she picked the Lorazepam up yesterday, it is the Fluoxetine that she needs and was not filled by the pharmacy.    Warm transferred to [facility] line for further assistance.

## 2025-03-25 NOTE — TELEPHONE ENCOUNTER
Patient called the RX Refill Line. Message is being forwarded to the office.     There seems to be a problem with [facility] filling the script for fluoxetine 20 mg. It does not appear to be on formulary. Patient does not want to wait for a prior auth she would like to start a medication today. Patient is asking for a alternative medication to be sent to the [address]    Patient is requesting a call back from the office to let her know when and what medication has been sent. Call [phone]

## 2025-03-26 RX ORDER — FLUOXETINE HYDROCHLORIDE 40 MG/1
CAPSULE ORAL
Refills: 0 | OUTPATIENT
Start: 2025-03-26

## 2025-04-02 ENCOUNTER — OFFICE VISIT (OUTPATIENT)
Dept: FAMILY MEDICINE CLINIC | Facility: CLINIC | Age: 55
End: 2025-04-02
Payer: COMMERCIAL

## 2025-04-02 VITALS
OXYGEN SATURATION: 95 % | BODY MASS INDEX: 41.11 KG/M2 | WEIGHT: 232 LBS | TEMPERATURE: 97.8 F | HEIGHT: 63 IN | HEART RATE: 84 BPM | SYSTOLIC BLOOD PRESSURE: 138 MMHG | DIASTOLIC BLOOD PRESSURE: 86 MMHG

## 2025-04-02 DIAGNOSIS — F33.1 MODERATE EPISODE OF RECURRENT MAJOR DEPRESSIVE DISORDER (HCC): Primary | ICD-10-CM

## 2025-04-02 DIAGNOSIS — J45.909 UNCOMPLICATED ASTHMA, UNSPECIFIED ASTHMA SEVERITY, UNSPECIFIED WHETHER PERSISTENT: ICD-10-CM

## 2025-04-02 PROBLEM — F33.2 MAJOR DEPRESSIVE DISORDER, RECURRENT SEVERE WITHOUT PSYCHOTIC FEATURES (HCC): Status: RESOLVED | Noted: 2024-11-19 | Resolved: 2025-04-02

## 2025-04-02 PROCEDURE — 99214 OFFICE O/P EST MOD 30 MIN: CPT | Performed by: FAMILY MEDICINE

## 2025-04-02 NOTE — ASSESSMENT & PLAN NOTE
Recommend switching to fluoxetine.  Side effect profile of medication reviewed.  Discontinue Lexapro.    She does have lorazepam at home.  She can take this if she gets anxiety attacks.  Side effect profile of medication reviewed.    Orders:    FLUoxetine (PROzac) 20 mg capsule; Take 1 capsule (20 mg total) by mouth daily

## 2025-04-02 NOTE — PROGRESS NOTES
Name: Rubina Ambriz      : 1970      MRN: [id number]  Encounter Provider: Ben Rios DO  Encounter Date: 2025   Encounter department: [facility] PRACTICE  :  Assessment & Plan  Moderate episode of recurrent major depressive disorder (HCC)  Recommend switching to fluoxetine.  Side effect profile of medication reviewed.  Discontinue Lexapro.    She does have lorazepam at home.  She can take this if she gets anxiety attacks.  Side effect profile of medication reviewed.    Orders:    FLUoxetine (PROzac) 20 mg capsule; Take 1 capsule (20 mg total) by mouth daily    Uncomplicated asthma, unspecified asthma severity, unspecified whether persistent         Patient also likely had mild gastritis last evening from tomato soup.  Consider GI consultation if symptoms persist.       History of Present Illness   Patient was started on Lexapro 2 weeks ago.  She states she still feels anxious.  She awoke last night feeling nauseous but she did have tomato soup for dinner.  She does take a proton pump inhibitor during the day.  Denies any acid reflux or nausea today.  She has lorazepam at home but has not been taking that on an as needed basis as previously directed.  She is concerned about addiction potential as we have discussed this in the past.    Chest Pain   Associated symptoms include dizziness and nausea.   Nausea  Associated symptoms include nausea. Pertinent negatives include no chest pain.   Dizziness  Associated symptoms include nausea. Pertinent negatives include no chest pain.     Review of Systems   Constitutional: Negative.    HENT: Negative.     Eyes: Negative.    Respiratory: Negative.     Cardiovascular:  Negative for chest pain.   Gastrointestinal:  Positive for nausea.   Endocrine: Negative.    Genitourinary: Negative.    Musculoskeletal: Negative.    Skin: Negative.    Allergic/Immunologic: Negative.    Neurological:  Positive for dizziness.   Hematological: Negative.   "  Psychiatric/Behavioral:  Positive for sleep disturbance. Negative for agitation, behavioral problems, confusion, decreased concentration, dysphoric mood, hallucinations, self-injury and suicidal ideas. The patient is nervous/anxious. The patient is not hyperactive.        Objective   /86   Pulse 84   Temp 97.8 °F (36.6 °C)   Ht 5' 3\" (1.6 m)   Wt 105 kg (232 lb)   SpO2 95%   BMI 41.10 kg/m²      Physical Exam  Constitutional:       General: She is not in acute distress.     Appearance: She is well-developed. She is not diaphoretic.   HENT:      Head: Normocephalic and atraumatic.      Right Ear: External ear normal.      Left Ear: External ear normal.      Nose: Nose normal.      Mouth/Throat:      Pharynx: Oropharynx is clear.   Eyes:      General: No scleral icterus.        Right eye: No discharge.         Left eye: No discharge.      Conjunctiva/sclera: Conjunctivae normal.      Pupils: Pupils are equal, round, and reactive to light.   Neck:      Thyroid: No thyromegaly.      Vascular: No JVD.      Trachea: No tracheal deviation.   Cardiovascular:      Rate and Rhythm: Normal rate and regular rhythm.      Heart sounds: Normal heart sounds. No murmur heard.     No friction rub. No gallop.   Pulmonary:      Effort: Pulmonary effort is normal. No respiratory distress.      Breath sounds: Normal breath sounds. No wheezing or rales.   Chest:      Chest wall: No tenderness.   Abdominal:      General: Bowel sounds are normal. There is no distension.      Palpations: Abdomen is soft. There is no mass.      Tenderness: There is no abdominal tenderness. There is no guarding or rebound.      Hernia: No hernia is present.   Musculoskeletal:         General: No tenderness or deformity. Normal range of motion.      Cervical back: Normal range of motion and neck supple.   Lymphadenopathy:      Cervical: No cervical adenopathy.   Skin:     General: Skin is warm and dry.      Capillary Refill: Capillary refill takes " less than 2 seconds.      Coloration: Skin is not pale.      Findings: No erythema or rash.   Neurological:      Mental Status: She is alert and oriented to person, place, and time.      Cranial Nerves: No cranial nerve deficit.      Sensory: No sensory deficit.      Motor: No abnormal muscle tone.      Coordination: Coordination normal.      Deep Tendon Reflexes: Reflexes normal.   Psychiatric:         Mood and Affect: Mood normal.         Behavior: Behavior normal.

## 2025-06-16 ENCOUNTER — OFFICE VISIT (OUTPATIENT)
Dept: FAMILY MEDICINE CLINIC | Facility: CLINIC | Age: 55
End: 2025-06-16
Payer: COMMERCIAL

## 2025-06-16 ENCOUNTER — TELEPHONE (OUTPATIENT)
Age: 55
End: 2025-06-16

## 2025-06-16 VITALS
BODY MASS INDEX: 41.25 KG/M2 | HEIGHT: 63 IN | TEMPERATURE: 96.4 F | HEART RATE: 68 BPM | DIASTOLIC BLOOD PRESSURE: 84 MMHG | WEIGHT: 232.8 LBS | OXYGEN SATURATION: 98 % | SYSTOLIC BLOOD PRESSURE: 128 MMHG

## 2025-06-16 DIAGNOSIS — B02.9 HERPES ZOSTER WITHOUT COMPLICATION: Primary | ICD-10-CM

## 2025-06-16 PROCEDURE — 99213 OFFICE O/P EST LOW 20 MIN: CPT | Performed by: FAMILY MEDICINE

## 2025-06-16 RX ORDER — VALACYCLOVIR HYDROCHLORIDE 1 G/1
1000 TABLET, FILM COATED ORAL 3 TIMES DAILY
Qty: 21 TABLET | Refills: 0 | Status: SHIPPED | OUTPATIENT
Start: 2025-06-16 | End: 2025-06-23

## 2025-06-16 NOTE — LETTER
June 16, 2025     Patient: Rubina Ambriz  YOB: 1970  Date of Visit: 6/16/2025      To Whom it May Concern:    Rubina Ambriz is under my professional care. Rubina was seen in my office on 6/16/2025. Rubina has shingles and needs to avoid contact of rash with keeping covered and not to be in public pool for 1-2 weeks.     If you have any questions or concerns, please don't hesitate to call.         Sincerely,          Pearl Mackay, DO        CC: No Recipients

## 2025-06-16 NOTE — TELEPHONE ENCOUNTER
Pt was seen today by Dr. Mackay for Shingles.     Dr. Mackay called in valACYclovir (VALTREX) 1,000 mg tablet but pt forgot to request medication for pain.     Pt reporting that the pain is getting worse.    Pt requesting Gabapentin if appropriate to take with other medications that she is currently on. If not Gabapentin pt asking if something else can be prescribed.    Please advise - [phone] - ok to leave voice message if no answer.

## 2025-06-16 NOTE — TELEPHONE ENCOUNTER
Patient called and is asking if pcp is willing to call in the pain medication. Patient stated at the time of her visit her pain level was about a 4/10 however patient is asking for the medication to help her at night as the pain seems to worsen then. Please advise is a pain medication can be called in.

## 2025-06-17 RX ORDER — NAPROXEN 500 MG/1
500 TABLET ORAL 2 TIMES DAILY WITH MEALS
Qty: 20 TABLET | Refills: 0 | Status: SHIPPED | OUTPATIENT
Start: 2025-06-17 | End: 2025-06-27

## 2025-06-17 NOTE — TELEPHONE ENCOUNTER
Patient calling to ask about pain medicine being sent to pharmacy. No prescription has been sent as a of this morning, so warm transferred to office clinical to further assist.

## 2025-06-17 NOTE — TELEPHONE ENCOUNTER
Pt called again and is requesting pain medication to help with the shingles she can not stand the pain it is moving and spreading.

## 2025-06-17 NOTE — TELEPHONE ENCOUNTER
Patient has called again about pain medication for the singles. States extreme pain and would like phone call just as soon as meds are called in.

## 2025-06-17 NOTE — TELEPHONE ENCOUNTER
Patient called to speak to  regarding un response to here prior messages.  Warm transfer to office Tonia.

## 2025-06-18 RX ORDER — GABAPENTIN 100 MG/1
CAPSULE ORAL
Qty: 33 CAPSULE | Refills: 0 | Status: SHIPPED | OUTPATIENT
Start: 2025-06-18 | End: 2025-06-25 | Stop reason: ALTCHOICE

## 2025-06-19 NOTE — PROGRESS NOTES
Name: Rubina Ambriz      : 1970      MRN: [id number]  Encounter Provider: Pearl Mackay DO  Encounter Date: 2025   Encounter department: [facility] PRACTICE  :  Assessment & Plan  Herpes zoster without complication    Given recent development of rash recommendation for treatment with 7-day course on valacyclovir.  Patient is advised to avoid alcohol while on this medication.  Prescription is provided for naproxen for pain management.  Recommend follow-up in 1 to 2 weeks or sooner as needed should symptoms persist or worsen.    Orders:  •  valACYclovir (VALTREX) 1,000 mg tablet; Take 1 tablet (1,000 mg total) by mouth 3 (three) times a day for 7 days  •  naproxen (Naprosyn) 500 mg tablet; Take 1 tablet (500 mg total) by mouth 2 (two) times a day with meals for 10 days           History of Present Illness   Becky is a 54-year-old female with past medical history of asthma, MICHELLE, MDD, obesity, long COVID who presents today for evaluation regarding possible shingles.  She notes approximately 1 week ago she started with pain on her side which became itchy over the weekend.  She notes a blister developed last night which was painful.  She notes left-sided chest discomfort which comes and goes.      Review of Systems   HENT: Negative.     Eyes: Negative.    Respiratory: Negative.     Cardiovascular:  Negative for chest pain.   Endocrine: Negative.    Genitourinary: Negative.    Musculoskeletal:  Positive for myalgias.   Skin:  Positive for rash.   Allergic/Immunologic: Negative.    Hematological: Negative.    Psychiatric/Behavioral:  Positive for sleep disturbance. Negative for agitation, behavioral problems, confusion, decreased concentration, dysphoric mood, hallucinations, self-injury and suicidal ideas. The patient is not hyperactive.        Objective   /84 (BP Location: Left arm, Patient Position: Sitting, Cuff Size: Large)   Pulse 68   Temp (!) 96.4 °F (35.8 °C) (Tympanic)   Ht 5'  "3\" (1.6 m)   Wt 106 kg (232 lb 12.8 oz)   SpO2 98%   BMI 41.24 kg/m²      Physical Exam  Vitals reviewed.   Constitutional:       General: She is not in acute distress.     Appearance: She is well-developed.   HENT:      Head: Normocephalic and atraumatic.      Right Ear: External ear normal.      Left Ear: External ear normal.     Eyes:      Conjunctiva/sclera: Conjunctivae normal.       Cardiovascular:      Rate and Rhythm: Normal rate and regular rhythm.      Heart sounds: No murmur heard.  Pulmonary:      Effort: Pulmonary effort is normal. No respiratory distress.      Breath sounds: Normal breath sounds.   Abdominal:      General: Bowel sounds are normal.      Palpations: Abdomen is soft.      Tenderness: There is no abdominal tenderness.     Musculoskeletal:      Right lower leg: No edema.      Left lower leg: No edema.     Skin:     General: Skin is warm and dry.      Comments: Grouped erythematous vesicles noted on the left side of the chest.     Neurological:      General: No focal deficit present.      Mental Status: She is alert and oriented to person, place, and time.     Psychiatric:         Mood and Affect: Mood normal.         Behavior: Behavior normal.         "

## 2025-06-25 ENCOUNTER — OFFICE VISIT (OUTPATIENT)
Dept: FAMILY MEDICINE CLINIC | Facility: CLINIC | Age: 55
End: 2025-06-25
Payer: COMMERCIAL

## 2025-06-25 VITALS
HEART RATE: 79 BPM | OXYGEN SATURATION: 98 % | TEMPERATURE: 97 F | SYSTOLIC BLOOD PRESSURE: 112 MMHG | WEIGHT: 233.76 LBS | BODY MASS INDEX: 41.42 KG/M2 | DIASTOLIC BLOOD PRESSURE: 72 MMHG | HEIGHT: 63 IN

## 2025-06-25 DIAGNOSIS — B02.9 HERPES ZOSTER WITHOUT COMPLICATION: Primary | ICD-10-CM

## 2025-06-25 PROCEDURE — 99214 OFFICE O/P EST MOD 30 MIN: CPT | Performed by: FAMILY MEDICINE

## 2025-06-25 RX ORDER — VALACYCLOVIR HYDROCHLORIDE 1 G/1
1000 TABLET, FILM COATED ORAL 3 TIMES DAILY
Qty: 21 TABLET | Refills: 0 | Status: SHIPPED | OUTPATIENT
Start: 2025-06-25 | End: 2025-07-02

## 2025-06-25 RX ORDER — GABAPENTIN 300 MG/1
300 CAPSULE ORAL 3 TIMES DAILY
Qty: 42 CAPSULE | Refills: 0 | Status: SHIPPED | OUTPATIENT
Start: 2025-06-25 | End: 2025-07-09

## 2025-06-25 NOTE — PROGRESS NOTES
"Name: Rubina Ambriz      : 1970      MRN: [id number]  Encounter Provider: Ben Rios DO  Encounter Date: 2025   Encounter department: [facility] PRACTICE  :  Assessment & Plan  Herpes zoster without complication  We reviewed medications.  Consider another 1 week course of Valtrex.  Recommended use of gabapentin as directed.  Side effect profile of medications reviewed.  Note was provided for work.  Patient had multiple questions regarding possible course of shingles and how contagious she is.  Time spent counseling reviewing treatment plan coordinating care and documentation was 35 minutes.  Orders:    gabapentin (NEURONTIN) 300 mg capsule; Take 1 capsule (300 mg total) by mouth 3 (three) times a day for 14 days    valACYclovir (VALTREX) 1,000 mg tablet; Take 1 tablet (1,000 mg total) by mouth 3 (three) times a day for 7 days           History of Present Illness   Patient was diagnosed with shingles last week.  She is shingles rash across the right mid thoracic area.  She is put on Valtrex and still has a rash and pain.  Naproxen has not been helpful for pain.  She did not  the prescription for Neurontin.  Pain is mild to moderate.  Denies any fevers.      Review of Systems   Constitutional: Negative.    HENT: Negative.     Eyes: Negative.    Respiratory: Negative.     Cardiovascular: Negative.    Gastrointestinal: Negative.    Endocrine: Negative.    Genitourinary: Negative.    Musculoskeletal:  Positive for back pain.   Skin:  Positive for rash.   Allergic/Immunologic: Negative.    Neurological: Negative.    Hematological: Negative.    Psychiatric/Behavioral: Negative.         Objective   /72 (BP Location: Left arm, Patient Position: Sitting, Cuff Size: Large)   Pulse 79   Temp (!) 97 °F (36.1 °C) (Tympanic)   Ht 5' 3\" (1.6 m)   Wt 106 kg (233 lb 12.2 oz)   SpO2 98%   BMI 41.41 kg/m²      Physical Exam  Constitutional:       General: She is not in acute distress.   "   Appearance: She is well-developed. She is not diaphoretic.   HENT:      Head: Normocephalic and atraumatic.      Right Ear: External ear normal.      Left Ear: External ear normal.      Nose: Nose normal.      Mouth/Throat:      Pharynx: Oropharynx is clear.     Eyes:      General: No scleral icterus.        Right eye: No discharge.         Left eye: No discharge.      Conjunctiva/sclera: Conjunctivae normal.      Pupils: Pupils are equal, round, and reactive to light.     Neck:      Thyroid: No thyromegaly.      Vascular: No JVD.      Trachea: No tracheal deviation.     Cardiovascular:      Rate and Rhythm: Normal rate and regular rhythm.      Heart sounds: Normal heart sounds. No murmur heard.     No friction rub. No gallop.   Pulmonary:      Effort: Pulmonary effort is normal. No respiratory distress.      Breath sounds: Normal breath sounds. No wheezing or rales.   Chest:      Chest wall: No tenderness.   Abdominal:      General: Bowel sounds are normal. There is no distension.      Palpations: Abdomen is soft. There is no mass.      Tenderness: There is no abdominal tenderness. There is no guarding or rebound.      Hernia: No hernia is present.     Musculoskeletal:         General: No tenderness or deformity. Normal range of motion.      Cervical back: Normal range of motion and neck supple.   Lymphadenopathy:      Cervical: No cervical adenopathy.     Skin:     General: Skin is warm and dry.      Capillary Refill: Capillary refill takes less than 2 seconds.      Coloration: Skin is not pale.      Findings: Erythema and rash (Dermatomal erythematous patchy papular areas to mid thoracic area consistent with shingles.  No scabbing at this time) present.     Neurological:      Mental Status: She is alert and oriented to person, place, and time.      Cranial Nerves: No cranial nerve deficit.      Sensory: No sensory deficit.      Motor: No abnormal muscle tone.      Coordination: Coordination normal.      Deep  Tendon Reflexes: Reflexes normal.     Psychiatric:         Mood and Affect: Mood normal.         Behavior: Behavior normal.

## 2025-06-27 ENCOUNTER — TELEPHONE (OUTPATIENT)
Age: 55
End: 2025-06-27

## 2025-06-27 ENCOUNTER — NURSE TRIAGE (OUTPATIENT)
Age: 55
End: 2025-06-27

## 2025-06-27 NOTE — TELEPHONE ENCOUNTER
"REASON FOR CONVERSATION: Medication Problem    SYMPTOMS: Feeling very nauseous, anxious, brain fog, headache. Unable to tolerate side effects from gabapentin.    OTHER HEALTH INFORMATION: Just started gabapentin on 6/25 for shingles pain.    PROTOCOL DISPOSITION: Discuss With PCP and Callback by Nurse Within 1 Hour    CARE ADVICE PROVIDED: Advised that message would be sent to Dr Rios and she will receive a call back today regarding medication.    PRACTICE FOLLOW-UP: Patient asking for a different medication to be prescribed for her pain.  She is not tolerating the side effects from gabapentin.  Please send to [facility]/pharmacy #[id number] [place], [place] - [address] [place] [address]  Patient would like a call back to advise if a new medication is prescribed. [phone]    Reason for Disposition   Caller has URGENT medicine question about med that PCP or specialist prescribed and triager unable to answer question    Answer Assessment - Initial Assessment Questions  1. NAME of MEDICINE: \"What medicine(s) are you calling about?\"      gabapentin (NEURONTIN) 300 mg capsule    2. QUESTION: \"What is your question?\" (e.g., double dose of medicine, side effect)      Patient having unwanted side effects. Would like a different medication prescribed.  3. PRESCRIBER: \"Who prescribed the medicine?\" Reason: if prescribed by specialist, call should be referred to that group.      Dr. Rios  4. SYMPTOMS: \"Do you have any symptoms?\" If Yes, ask: \"What symptoms are you having?\"  \"How bad are the symptoms (e.g., mild, moderate, severe)      Feeling very nauseous, anxious, brain fog, headache    Protocols used: Medication Question Call-Adult-[place]    "

## 2025-06-30 NOTE — TELEPHONE ENCOUNTER
I do not have any other recommendations.  Agree with use of Tylenol and naproxen.  She can take these at the same time.

## 2025-06-30 NOTE — TELEPHONE ENCOUNTER
Pt is asking for something stronger then tylenol or naproxen she is having a lot of pain and a lot of side effects from the gabapentin

## 2025-07-23 ENCOUNTER — OFFICE VISIT (OUTPATIENT)
Dept: FAMILY MEDICINE CLINIC | Facility: CLINIC | Age: 55
End: 2025-07-23
Payer: COMMERCIAL

## 2025-07-23 VITALS
TEMPERATURE: 97.7 F | HEART RATE: 80 BPM | WEIGHT: 233 LBS | BODY MASS INDEX: 41.29 KG/M2 | HEIGHT: 63 IN | SYSTOLIC BLOOD PRESSURE: 110 MMHG | RESPIRATION RATE: 16 BRPM | DIASTOLIC BLOOD PRESSURE: 76 MMHG | OXYGEN SATURATION: 98 %

## 2025-07-23 DIAGNOSIS — R49.0 HOARSENESS: Primary | ICD-10-CM

## 2025-07-23 PROCEDURE — 99213 OFFICE O/P EST LOW 20 MIN: CPT | Performed by: FAMILY MEDICINE

## 2025-07-23 RX ORDER — BUDESONIDE, GLYCOPYRROLATE, AND FORMOTEROL FUMARATE 160; 9; 4.8 UG/1; UG/1; UG/1
2 AEROSOL, METERED RESPIRATORY (INHALATION) 2 TIMES DAILY
COMMUNITY

## 2025-07-23 RX ORDER — METHYLPREDNISOLONE 4 MG/1
TABLET ORAL
Qty: 21 EACH | Refills: 0 | Status: SHIPPED | OUTPATIENT
Start: 2025-07-23

## 2025-07-23 NOTE — PROGRESS NOTES
":  Assessment & Plan  Hoarseness  Discussed treatment options.  Patient started on Medrol Dosepak.  She may gargle with warm salt water and drink warm honey with tea.  If symptoms persist recommend referral to ENT for further evaluation and treatment.  Return to the office in 1 week or call sooner as needed.  Orders:    methylPREDNISolone 4 MG tablet therapy pack; Use as directed on package    Ambulatory Referral to Otolaryngology; Future        History of Present Illness     Rubina Ambriz is a 54 y.o. female   Patient complains of hoarse voice for the past 3 weeks after recent bout of shingles which was treated with 2 courses of Valtrex.  Patient has a history of long COVID.  Patient admits to intermittent sore throat and headache but denies any fever or difficulty swallowing.  She denies cough or reflux symptoms.  Patient uses her inhalers rarely at this point.    Sore Throat   This is a new problem. The current episode started 1 to 4 weeks ago. The problem has been unchanged. There has been no fever. Associated symptoms include headaches and a hoarse voice. Pertinent negatives include no congestion, coughing, ear pain, shortness of breath, swollen glands or trouble swallowing. She has had no exposure to strep or mono. She has tried nothing for the symptoms.     Review of Systems   HENT:  Positive for hoarse voice and sore throat. Negative for congestion, ear pain and trouble swallowing.    Respiratory:  Negative for cough and shortness of breath.    Neurological:  Positive for headaches.     Objective   /76   Pulse 80   Temp 97.7 °F (36.5 °C)   Resp 16   Ht 5' 3\" (1.6 m)   Wt 106 kg (233 lb)   SpO2 98%   BMI 41.27 kg/m²      Physical Exam  Constitutional:       General: She is not in acute distress.     Appearance: Normal appearance. She is not ill-appearing, toxic-appearing or diaphoretic.   HENT:      Head: Normocephalic.      Right Ear: Tympanic membrane normal.      Left Ear: Tympanic membrane " normal.      Nose: Nose normal.      Mouth/Throat:      Mouth: Mucous membranes are moist.      Pharynx: Oropharynx is clear.     Eyes:      General: No scleral icterus.     Conjunctiva/sclera: Conjunctivae normal.     Neck:      Vascular: No carotid bruit.     Cardiovascular:      Rate and Rhythm: Normal rate and regular rhythm.   Pulmonary:      Effort: Pulmonary effort is normal. No respiratory distress.      Breath sounds: Normal breath sounds. No wheezing.   Abdominal:      Palpations: Abdomen is soft.      Tenderness: There is no abdominal tenderness.     Musculoskeletal:      Cervical back: Neck supple.      Right lower leg: No edema.      Left lower leg: No edema.   Lymphadenopathy:      Cervical: No cervical adenopathy.     Skin:     General: Skin is warm and dry.     Neurological:      General: No focal deficit present.      Mental Status: She is alert and oriented to person, place, and time.     Psychiatric:         Mood and Affect: Mood normal.         Behavior: Behavior normal.         Thought Content: Thought content normal.         Judgment: Judgment normal.

## 2025-08-20 ENCOUNTER — TELEPHONE (OUTPATIENT)
Age: 55
End: 2025-08-20